# Patient Record
Sex: MALE | Race: BLACK OR AFRICAN AMERICAN | Employment: UNEMPLOYED | URBAN - NONMETROPOLITAN AREA
[De-identification: names, ages, dates, MRNs, and addresses within clinical notes are randomized per-mention and may not be internally consistent; named-entity substitution may affect disease eponyms.]

---

## 2023-04-28 ENCOUNTER — APPOINTMENT (OUTPATIENT)
Dept: CT IMAGING | Age: 28
End: 2023-04-28
Attending: EMERGENCY MEDICINE
Payer: COMMERCIAL

## 2023-04-28 ENCOUNTER — HOSPITAL ENCOUNTER (EMERGENCY)
Age: 28
Discharge: ANOTHER HOSPITAL | End: 2023-04-28
Attending: EMERGENCY MEDICINE
Payer: COMMERCIAL

## 2023-04-28 VITALS
DIASTOLIC BLOOD PRESSURE: 96 MMHG | WEIGHT: 151 LBS | HEIGHT: 68 IN | TEMPERATURE: 99 F | SYSTOLIC BLOOD PRESSURE: 133 MMHG | BODY MASS INDEX: 22.88 KG/M2 | HEART RATE: 91 BPM | RESPIRATION RATE: 16 BRPM | OXYGEN SATURATION: 96 %

## 2023-04-28 DIAGNOSIS — J39.0 PARAPHARYNGEAL ABSCESS: Primary | ICD-10-CM

## 2023-04-28 LAB
ALBUMIN SERPL-MCNC: 4.5 G/DL (ref 3.5–5)
ALBUMIN/GLOB SERPL: 1.1 (ref 1.1–2.2)
ALP SERPL-CCNC: 90 U/L (ref 45–117)
ALT SERPL-CCNC: 19 U/L (ref 12–78)
ANION GAP SERPL CALC-SCNC: 11 MMOL/L (ref 5–15)
AST SERPL W P-5'-P-CCNC: 17 U/L (ref 15–37)
BASOPHILS # BLD: 0.1 K/UL (ref 0–0.1)
BASOPHILS NFR BLD: 0 % (ref 0–1)
BILIRUB SERPL-MCNC: 1.4 MG/DL (ref 0.2–1)
BUN SERPL-MCNC: 9 MG/DL (ref 6–20)
BUN/CREAT SERPL: 8 (ref 12–20)
CA-I BLD-MCNC: 9.9 MG/DL (ref 8.5–10.1)
CHLORIDE SERPL-SCNC: 99 MMOL/L (ref 97–108)
CO2 SERPL-SCNC: 28 MMOL/L (ref 21–32)
CREAT SERPL-MCNC: 1.15 MG/DL (ref 0.7–1.3)
DEPRECATED S PYO AG THROAT QL EIA: NEGATIVE
DIFFERENTIAL METHOD BLD: ABNORMAL
EOSINOPHIL # BLD: 0 K/UL (ref 0–0.4)
EOSINOPHIL NFR BLD: 0 % (ref 0–7)
ERYTHROCYTE [DISTWIDTH] IN BLOOD BY AUTOMATED COUNT: 12.5 % (ref 11.5–14.5)
GLOBULIN SER CALC-MCNC: 4.2 G/DL (ref 2–4)
GLUCOSE SERPL-MCNC: 88 MG/DL (ref 65–100)
HCT VFR BLD AUTO: 44 % (ref 36.6–50.3)
HGB BLD-MCNC: 15.8 G/DL (ref 12.1–17)
IMM GRANULOCYTES # BLD AUTO: 0.1 K/UL (ref 0–0.04)
IMM GRANULOCYTES NFR BLD AUTO: 1 % (ref 0–0.5)
INR PPP: 1.3 (ref 0.9–1.1)
LACTATE SERPL-SCNC: 1.1 MMOL/L (ref 0.4–2)
LYMPHOCYTES # BLD: 1.7 K/UL (ref 0.8–3.5)
LYMPHOCYTES NFR BLD: 10 % (ref 12–49)
MCH RBC QN AUTO: 32.6 PG (ref 26–34)
MCHC RBC AUTO-ENTMCNC: 35.9 G/DL (ref 30–36.5)
MCV RBC AUTO: 90.7 FL (ref 80–99)
MONOCYTES # BLD: 1.6 K/UL (ref 0–1)
MONOCYTES NFR BLD: 10 % (ref 5–13)
NEUTS SEG # BLD: 13.9 K/UL (ref 1.8–8)
NEUTS SEG NFR BLD: 79 % (ref 32–75)
NRBC # BLD: 0 K/UL (ref 0–0.01)
NRBC BLD-RTO: 0 PER 100 WBC
PLATELET # BLD AUTO: 235 K/UL (ref 150–400)
PMV BLD AUTO: 9.8 FL (ref 8.9–12.9)
POTASSIUM SERPL-SCNC: 4.2 MMOL/L (ref 3.5–5.1)
PROT SERPL-MCNC: 8.7 G/DL (ref 6.4–8.2)
PROTHROMBIN TIME: 15.3 SEC (ref 11.9–14.6)
RBC # BLD AUTO: 4.85 M/UL (ref 4.1–5.7)
SODIUM SERPL-SCNC: 138 MMOL/L (ref 136–145)
WBC # BLD AUTO: 17.4 K/UL (ref 4.1–11.1)

## 2023-04-28 PROCEDURE — 74011000636 HC RX REV CODE- 636: Performed by: EMERGENCY MEDICINE

## 2023-04-28 PROCEDURE — 80053 COMPREHEN METABOLIC PANEL: CPT

## 2023-04-28 PROCEDURE — 70491 CT SOFT TISSUE NECK W/DYE: CPT

## 2023-04-28 PROCEDURE — 74011000258 HC RX REV CODE- 258: Performed by: EMERGENCY MEDICINE

## 2023-04-28 PROCEDURE — 85025 COMPLETE CBC W/AUTO DIFF WBC: CPT

## 2023-04-28 PROCEDURE — 99285 EMERGENCY DEPT VISIT HI MDM: CPT

## 2023-04-28 PROCEDURE — 83605 ASSAY OF LACTIC ACID: CPT

## 2023-04-28 PROCEDURE — 96375 TX/PRO/DX INJ NEW DRUG ADDON: CPT

## 2023-04-28 PROCEDURE — 96365 THER/PROPH/DIAG IV INF INIT: CPT

## 2023-04-28 PROCEDURE — 74011000250 HC RX REV CODE- 250: Performed by: EMERGENCY MEDICINE

## 2023-04-28 PROCEDURE — 87070 CULTURE OTHR SPECIMN AEROBIC: CPT

## 2023-04-28 PROCEDURE — 85610 PROTHROMBIN TIME: CPT

## 2023-04-28 PROCEDURE — 87040 BLOOD CULTURE FOR BACTERIA: CPT

## 2023-04-28 PROCEDURE — 87880 STREP A ASSAY W/OPTIC: CPT

## 2023-04-28 PROCEDURE — 74011250636 HC RX REV CODE- 250/636: Performed by: EMERGENCY MEDICINE

## 2023-04-28 RX ORDER — KETOROLAC TROMETHAMINE 30 MG/ML
30 INJECTION, SOLUTION INTRAMUSCULAR; INTRAVENOUS ONCE
Status: COMPLETED | OUTPATIENT
Start: 2023-04-28 | End: 2023-04-28

## 2023-04-28 RX ORDER — DIPHENHYDRAMINE HYDROCHLORIDE 50 MG/ML
50 INJECTION, SOLUTION INTRAMUSCULAR; INTRAVENOUS
Status: COMPLETED | OUTPATIENT
Start: 2023-04-28 | End: 2023-04-28

## 2023-04-28 RX ADMIN — SODIUM CHLORIDE 1000 ML: 9 INJECTION, SOLUTION INTRAVENOUS at 16:10

## 2023-04-28 RX ADMIN — IOPAMIDOL 100 ML: 755 INJECTION, SOLUTION INTRAVENOUS at 17:36

## 2023-04-28 RX ADMIN — KETOROLAC TROMETHAMINE 30 MG: 30 INJECTION, SOLUTION INTRAMUSCULAR at 16:14

## 2023-04-28 RX ADMIN — CEFTRIAXONE 2 G: 2 INJECTION, POWDER, FOR SOLUTION INTRAMUSCULAR; INTRAVENOUS at 16:13

## 2023-04-28 RX ADMIN — METHYLPREDNISOLONE SODIUM SUCCINATE 125 MG: 125 INJECTION INTRAMUSCULAR; INTRAVENOUS at 16:15

## 2023-04-28 RX ADMIN — DIPHENHYDRAMINE HYDROCHLORIDE 50 MG: 50 INJECTION, SOLUTION INTRAMUSCULAR; INTRAVENOUS at 16:09

## 2023-04-28 NOTE — PROGRESS NOTES
Discussed with ED. CT reviewed. Appears to be peritonsillar abscess. Will likely need drainage. OK to txfer to medical service at [facility] and I will consult in the AM. Please make NPO post midnight.  No need to call me with updates on transfer as I will plan on seeing him in the AM.

## 2023-04-28 NOTE — ED PROVIDER NOTES
[facility] EMERGENCY DEPT  EMERGENCY DEPARTMENT HISTORY AND PHYSICAL EXAM      Date: 4/28/2023  Patient Name: Michael Mario  MRN: [id number]  [place]: 1995  Date of evaluation: 4/28/2023  Provider: Jerome Girard MD   Note Started: 3:52 PM 4/28/23    HISTORY OF PRESENT ILLNESS     Chief Complaint   Patient presents with    Sore Throat       History Provided By: Patient    HPI: Erica Krueger Person is a 32 y.o. male     PAST MEDICAL HISTORY   Past Medical History:  History reviewed. No pertinent past medical history. Past Surgical History:  No past surgical history on file. Family History:  History reviewed. No pertinent family history. Social History: Allergies:  No Known Allergies    PCP: No primary care provider on file. Current Meds:   Previous Medications    No medications on file       PHYSICAL EXAM     ED Triage Vitals [04/28/23 1549]   ED Encounter Vitals Group      /83      Pulse (Heart Rate) (!) 104      Resp Rate 20      Temp 99 °F (37.2 °C)      Temp src       O2 Sat (%) 100 %      Weight 151 lb      Height 5' 8\"      Physical Exam  Vitals and nursing note reviewed. Constitutional:       General: He is not in acute distress. Appearance: He is not ill-appearing, toxic-appearing or diaphoretic. HENT:      Head: Normocephalic and atraumatic. Right Ear: Tympanic membrane normal.      Left Ear: Tympanic membrane normal.      Nose: Nose normal. No congestion. Mouth/Throat:      Mouth: Mucous membranes are moist.      Pharynx: Pharyngeal swelling and posterior oropharyngeal erythema present. Eyes:      Extraocular Movements: Extraocular movements intact. Conjunctiva/sclera: Conjunctivae normal.      Pupils: Pupils are equal, round, and reactive to light. Cardiovascular:      Rate and Rhythm: Normal rate and regular rhythm. Pulses: Normal pulses. Heart sounds: Normal heart sounds.    Pulmonary:      Effort: Pulmonary effort is normal.      Breath sounds: Normal breath sounds. Abdominal:      General: Bowel sounds are normal.      Palpations: Abdomen is soft. Tenderness: There is no abdominal tenderness. Musculoskeletal:         General: No tenderness, deformity or signs of injury. Normal range of motion. Cervical back: Normal range of motion and neck supple. No rigidity or tenderness. Lymphadenopathy:      Cervical: No cervical adenopathy. Skin:     General: Skin is warm and dry. Capillary Refill: Capillary refill takes less than 2 seconds. Findings: No rash. Neurological:      General: No focal deficit present. Mental Status: He is alert and oriented to person, place, and time. Cranial Nerves: No cranial nerve deficit. Sensory: No sensory deficit. Psychiatric:         Mood and Affect: Mood normal.         Behavior: Behavior normal.         SCREENINGS              LAB, EKG AND DIAGNOSTIC RESULTS   Labs:  No results found for this or any previous visit (from the past 12 hour(s)). EKG: Initial EKG interpreted by me. Not Applicable    Radiologic Studies:  Non-plain film images such as CT, Ultrasound and MRI are read by the radiologist. Plain radiographic images are visualized and preliminarily interpreted by the ED Physician with the following findings: CT shows right parapharyngeal abscess    Interpretation per the Radiologist below, if available at the time of this note:  No results found. PROCEDURES   Unless otherwise noted below, none.   Procedures      ED COURSE and DIFFERENTIAL DIAGNOSIS/MDM   CC/HPI/PE Summary, DDx: 23-year-old male presents with complaint of sore throat for 2 days patient presents today because of difficulty swallowing liquids and he has to spit when saliva accumulates in mouth    DDx pharyngeal abscess, pharyngitis        Records Reviewed (source and summary of external notes): Prior medical records and Nursing notes    Vitals:    Vitals:    04/28/23 1549   BP: 132/83   Pulse: (!) 104   Resp: 20   Temp: 99 °F (37.2 °C)   SpO2: 100%   Weight: 68.5 kg (151 lb)   Height: 5' 8\" (1.727 m)        ED COURSE  ED Course as of 04/28/23 2212 Fri Apr 28, 2023 1921 Dr. Sudha Escobar, ENT [facility] has accepted patient for transfer, patient is to be admitted to the hospitalist service waiting for transfer center called back when [facility] hospitalist has been contacted [SB]   1929 Patient concerned about going to [place] claims he does not have transportation to return home [SB]   1936 Dr Speedy Reardon hospitalist accepted patient for transfer to [facility] 5 bed [SB]   1951 Transfer center was recontacted to see if [place] [id number] which is closer to the patient's home if they have ENT [SB]   2001 [place] [address] has ENT, the hospitalist would like to review patients chart.  It will be faxed to them [SB]   2025 Pt has decided to proceed with transfer to [facility] for management of right parapharyngeal abscess [SB]   2208 Pt resting comfortably, will make pt npo after midnight [SB]      ED Course User Index  [SB] Chely Paz MD       Disposition Considerations (Tests not done, Shared Decision Making, Pt Expectation of Test or Treatment.):  Patient to be transferred     Patient was given the following medications:  Medications - No data to display    CONSULTS: (Who and What was discussed)  None     Social Determinants affecting Dx or Tx: None    Smoking Cessation: Not Applicable    CRITICAL CARE TIME   Critical Care Time:   CRITICAL CARE NOTE :    8:28 PM    Amount of Critical Care Time: __37__    IMPENDING DETERIORATION -Airway  ASSOCIATED RISK FACTORS - Hypoxia  MANAGEMENT- Bedside Assessment, Supervision of Care, and Transfer  INTERPRETATION -  CT Scan  INTERVENTIONS - Metobolic interventions  CASE REVIEW - Hospitalist/Intensivist, Medical Sub-Specialist, and Nursing  TREATMENT RESPONSE -Stable  PERFORMED BY - Self    NOTES   :  I have spent critical care time involved in lab review, consultations with specialist, family decision- making, bedside attention and documentation. This time excludes time spent in any separate billed procedures. During this entire length of time I was immediately available to the patient . FINAL IMPRESSION   No diagnosis found. DISPOSITION/PLAN       Transfer: The patient is being transferred to [facility] for parapharyngeal abscess. The results of their tests and reasons for their transfer have been discussed with the patient and/or available family. The patient/family has conveyed agreement and understanding for the need to be admitted and for their admission diagnosis. Consultation has been made with Marc Coughlin, who agrees to accept the transfer. PATIENT REFERRED TO:  Follow-up Information    None           DISCHARGE MEDICATIONS:  There are no discharge medications for this patient. DISCONTINUED MEDICATIONS:  There are no discharge medications for this patient. I am the Primary Clinician of Record: Liyah Benson MD (electronically signed)    (Please note that parts of this dictation were completed with voice recognition software. Quite often unanticipated grammatical, syntax, homophones, and other interpretive errors are inadvertently transcribed by the computer software. Please disregards these errors.  Please excuse any errors that have escaped final proofreading.)

## 2023-04-28 NOTE — ED NOTES
Pt and girlfriend made aware by MD  of CT results and the need to transfer. Currently on phone with Transfer Center- on hold at this time.  MD states pt can go to [facility]

## 2023-04-28 NOTE — ED NOTES
Transfer Center states no ENT coverage at [facility].  States she will reach out to ENT on-call at [facility] and call back

## 2023-04-28 NOTE — ED TRIAGE NOTES
Pt reports sore throat x 3 days. Pt reports painful swallowing. Pt went to Dr. Carri Colindres office today and was referred to the ER.

## 2023-04-29 ENCOUNTER — HOSPITAL ENCOUNTER (INPATIENT)
Age: 28
LOS: 1 days | Discharge: HOME | End: 2023-04-29
Attending: FAMILY MEDICINE | Admitting: FAMILY MEDICINE
Payer: COMMERCIAL

## 2023-04-29 VITALS
WEIGHT: 151 LBS | SYSTOLIC BLOOD PRESSURE: 120 MMHG | HEIGHT: 68 IN | OXYGEN SATURATION: 98 % | HEART RATE: 85 BPM | DIASTOLIC BLOOD PRESSURE: 89 MMHG | BODY MASS INDEX: 22.88 KG/M2 | RESPIRATION RATE: 18 BRPM | TEMPERATURE: 97.9 F

## 2023-04-29 PROBLEM — J39.0 PARAPHARYNGEAL ABSCESS: Status: ACTIVE | Noted: 2023-04-29

## 2023-04-29 PROBLEM — D72.829 LEUKOCYTOSIS, UNSPECIFIED TYPE: Status: ACTIVE | Noted: 2023-04-29

## 2023-04-29 PROBLEM — J39.0 PARAPHARYNGEAL ABSCESS: Status: RESOLVED | Noted: 2023-04-29 | Resolved: 2023-04-29

## 2023-04-29 PROBLEM — R13.10 ODYNOPHAGIA: Status: ACTIVE | Noted: 2023-04-29

## 2023-04-29 LAB
ANION GAP SERPL CALC-SCNC: 5 MMOL/L (ref 5–15)
APTT PPP: 29.5 SEC (ref 22.1–31)
BACTERIA SPEC CULT: NORMAL
BASOPHILS # BLD: 0 K/UL (ref 0–0.1)
BASOPHILS NFR BLD: 0 % (ref 0–1)
BUN SERPL-MCNC: 17 MG/DL (ref 6–20)
BUN/CREAT SERPL: 14 (ref 12–20)
CALCIUM SERPL-MCNC: 9.4 MG/DL (ref 8.5–10.1)
CHLORIDE SERPL-SCNC: 103 MMOL/L (ref 97–108)
CO2 SERPL-SCNC: 25 MMOL/L (ref 21–32)
CREAT SERPL-MCNC: 1.21 MG/DL (ref 0.7–1.3)
DIFFERENTIAL METHOD BLD: ABNORMAL
EOSINOPHIL # BLD: 0 K/UL (ref 0–0.4)
EOSINOPHIL NFR BLD: 0 % (ref 0–7)
ERYTHROCYTE [DISTWIDTH] IN BLOOD BY AUTOMATED COUNT: 12.4 % (ref 11.5–14.5)
GLUCOSE SERPL-MCNC: 246 MG/DL (ref 65–100)
HCT VFR BLD AUTO: 40.5 % (ref 36.6–50.3)
HGB BLD-MCNC: 14.1 G/DL (ref 12.1–17)
IMM GRANULOCYTES # BLD AUTO: 0 K/UL (ref 0–0.04)
IMM GRANULOCYTES NFR BLD AUTO: 0 % (ref 0–0.5)
INR PPP: 1.1 (ref 0.9–1.1)
LYMPHOCYTES # BLD: 0.9 K/UL (ref 0.8–3.5)
LYMPHOCYTES NFR BLD: 7 % (ref 12–49)
MAGNESIUM SERPL-MCNC: 2.3 MG/DL (ref 1.6–2.4)
MCH RBC QN AUTO: 31.8 PG (ref 26–34)
MCHC RBC AUTO-ENTMCNC: 34.8 G/DL (ref 30–36.5)
MCV RBC AUTO: 91.2 FL (ref 80–99)
MONOCYTES # BLD: 0.4 K/UL (ref 0–1)
MONOCYTES NFR BLD: 3 % (ref 5–13)
NEUTS SEG # BLD: 11.4 K/UL (ref 1.8–8)
NEUTS SEG NFR BLD: 90 % (ref 32–75)
NRBC # BLD: 0 K/UL (ref 0–0.01)
NRBC BLD-RTO: 0 PER 100 WBC
PLATELET # BLD AUTO: 229 K/UL (ref 150–400)
PMV BLD AUTO: 10.2 FL (ref 8.9–12.9)
POTASSIUM SERPL-SCNC: 4.6 MMOL/L (ref 3.5–5.1)
PROTHROMBIN TIME: 11.7 SEC (ref 9–11.1)
RBC # BLD AUTO: 4.44 M/UL (ref 4.1–5.7)
SODIUM SERPL-SCNC: 133 MMOL/L (ref 136–145)
SPECIAL REQUESTS,SREQ: NORMAL
THERAPEUTIC RANGE,PTTT: NORMAL SECS (ref 58–77)
WBC # BLD AUTO: 12.7 K/UL (ref 4.1–11.1)

## 2023-04-29 PROCEDURE — 85730 THROMBOPLASTIN TIME PARTIAL: CPT

## 2023-04-29 PROCEDURE — 74011250636 HC RX REV CODE- 250/636: Performed by: FAMILY MEDICINE

## 2023-04-29 PROCEDURE — 85610 PROTHROMBIN TIME: CPT

## 2023-04-29 PROCEDURE — 36415 COLL VENOUS BLD VENIPUNCTURE: CPT

## 2023-04-29 PROCEDURE — 85025 COMPLETE CBC W/AUTO DIFF WBC: CPT

## 2023-04-29 PROCEDURE — 74011250637 HC RX REV CODE- 250/637: Performed by: FAMILY MEDICINE

## 2023-04-29 PROCEDURE — 96374 THER/PROPH/DIAG INJ IV PUSH: CPT

## 2023-04-29 PROCEDURE — 83735 ASSAY OF MAGNESIUM: CPT

## 2023-04-29 PROCEDURE — 74011250637 HC RX REV CODE- 250/637: Performed by: NURSE PRACTITIONER

## 2023-04-29 PROCEDURE — 65270000029 HC RM PRIVATE

## 2023-04-29 PROCEDURE — 80048 BASIC METABOLIC PNL TOTAL CA: CPT

## 2023-04-29 PROCEDURE — G0378 HOSPITAL OBSERVATION PER HR: HCPCS

## 2023-04-29 PROCEDURE — 74011000250 HC RX REV CODE- 250: Performed by: OTOLARYNGOLOGY

## 2023-04-29 RX ORDER — POLYETHYLENE GLYCOL 3350 17 G/17G
17 POWDER, FOR SOLUTION ORAL DAILY PRN
Status: DISCONTINUED | OUTPATIENT
Start: 2023-04-29 | End: 2023-04-29 | Stop reason: HOSPADM

## 2023-04-29 RX ORDER — SODIUM CHLORIDE 0.9 % (FLUSH) 0.9 %
5-40 SYRINGE (ML) INJECTION EVERY 8 HOURS
Status: DISCONTINUED | OUTPATIENT
Start: 2023-04-29 | End: 2023-04-29 | Stop reason: HOSPADM

## 2023-04-29 RX ORDER — KETOROLAC TROMETHAMINE 30 MG/ML
30 INJECTION, SOLUTION INTRAMUSCULAR; INTRAVENOUS
Status: DISCONTINUED | OUTPATIENT
Start: 2023-04-29 | End: 2023-04-29 | Stop reason: HOSPADM

## 2023-04-29 RX ORDER — AMOXICILLIN AND CLAVULANATE POTASSIUM 875; 125 MG/1; MG/1
1 TABLET, FILM COATED ORAL EVERY 12 HOURS
Qty: 19 TABLET | Refills: 0 | Status: SHIPPED | OUTPATIENT
Start: 2023-04-29 | End: 2023-05-09

## 2023-04-29 RX ORDER — IBUPROFEN 200 MG
1 TABLET ORAL DAILY
Status: DISCONTINUED | OUTPATIENT
Start: 2023-04-29 | End: 2023-04-29 | Stop reason: HOSPADM

## 2023-04-29 RX ORDER — ONDANSETRON 4 MG/1
4 TABLET, ORALLY DISINTEGRATING ORAL
Status: DISCONTINUED | OUTPATIENT
Start: 2023-04-29 | End: 2023-04-29 | Stop reason: HOSPADM

## 2023-04-29 RX ORDER — SODIUM CHLORIDE 9 MG/ML
75 INJECTION, SOLUTION INTRAVENOUS CONTINUOUS
Status: DISCONTINUED | OUTPATIENT
Start: 2023-04-29 | End: 2023-04-29 | Stop reason: HOSPADM

## 2023-04-29 RX ORDER — AMOXICILLIN AND CLAVULANATE POTASSIUM 875; 125 MG/1; MG/1
1 TABLET, FILM COATED ORAL EVERY 12 HOURS
Status: DISCONTINUED | OUTPATIENT
Start: 2023-04-29 | End: 2023-04-29 | Stop reason: HOSPADM

## 2023-04-29 RX ORDER — ACETAMINOPHEN 325 MG/1
650 TABLET ORAL
Status: DISCONTINUED | OUTPATIENT
Start: 2023-04-29 | End: 2023-04-29 | Stop reason: HOSPADM

## 2023-04-29 RX ORDER — ONDANSETRON 2 MG/ML
4 INJECTION INTRAMUSCULAR; INTRAVENOUS
Status: DISCONTINUED | OUTPATIENT
Start: 2023-04-29 | End: 2023-04-29 | Stop reason: HOSPADM

## 2023-04-29 RX ORDER — SODIUM CHLORIDE 0.9 % (FLUSH) 0.9 %
5-40 SYRINGE (ML) INJECTION AS NEEDED
Status: DISCONTINUED | OUTPATIENT
Start: 2023-04-29 | End: 2023-04-29 | Stop reason: HOSPADM

## 2023-04-29 RX ORDER — ACETAMINOPHEN 650 MG/1
650 SUPPOSITORY RECTAL
Status: DISCONTINUED | OUTPATIENT
Start: 2023-04-29 | End: 2023-04-29 | Stop reason: HOSPADM

## 2023-04-29 RX ADMIN — SODIUM CHLORIDE 75 ML/HR: 900 INJECTION, SOLUTION INTRAVENOUS at 01:33

## 2023-04-29 RX ADMIN — METHYLPREDNISOLONE SODIUM SUCCINATE 40 MG: 40 INJECTION, POWDER, FOR SOLUTION INTRAMUSCULAR; INTRAVENOUS at 10:27

## 2023-04-29 RX ADMIN — BENZOCAINE, BUTAMBEN, AND TETRACAINE HYDROCHLORIDE 1 SPRAY: .028; .004; .004 AEROSOL, SPRAY TOPICAL at 08:22

## 2023-04-29 RX ADMIN — AMOXICILLIN AND CLAVULANATE POTASSIUM 1 TABLET: 875; 125 TABLET, FILM COATED ORAL at 11:40

## 2023-04-29 NOTE — DISCHARGE SUMMARY
Discharge Summary     PATIENT ID: Dodie Calles  MRN: [id number]   YOB: 1995    DATE OF ADMISSION: 4/29/2023 12:48 AM    DATE OF DISCHARGE: 4/29/2023   PRIMARY CARE PROVIDER: None - Unknown name  ATTENDING PHYSICIAN: Jonah Jordan MD  DISCHARGING PROVIDER: Evelene Koyanagi, NP      CONSULTATIONS: IP CONSULT TO OTOLARYNGOLOGY    PROCEDURES/SURGERIES: * No surgery found *    [address] [facility],[address] COURSE:     Mr. Leon Pond is a 32 y.o. male with no significant past medical history presented as a direct admission/transfer from [facility] ED with chief complaint of sore throat and difficulty swallowing. Symptoms onset reported began ~ 3 days PTA, has been constant/severe, mostly right-sided without specific alleviating factors. No reports of fever, chills, recent upper respiratory infections noted. ED consulted ENT on call covering for [facility], recommendations were for transfer to [facility]. On arrival, patient notes some improvement in his pain symptoms and was able to minimally swallow. DISCHARGE DIAGNOSES / PLAN:      Parapharyngeal abscess  - CT neck soft tissues with IV contrast showed 3.2 cm x 2.5 cm x 2.5 cm fluid collection in the right parapharyngeal area causing mass effect on the adjacent airway with several enlarged lymph nodes in the right neck thought likely reactive.     - ENT for drainage of abscess, done this am. Only follow up as needed if symptoms recur - Dr. Macario Ortiz information provided on d/c information  - start on augmentin, d/c on 10 days to complete course     Odynophagia  - due to parapharyngeal abscess  - tylenol for pain as needed  - soft foods     Leukocytosis unspecified  - likely due to abscess  - WBC 17.4 ->12.7  - outpatient follow up with PCP     Tobacco abuse  - Encourage smoking cessation  - may consider OTC patches on discharge     Hyperbilirubinemia  - T. bili 1.4  - follow up outpatient     Alcohol abuse  - Encourage alcohol reduction      Tachycardia  - Currently resolved, HR 86 on exam today    BMI: Body mass index is 22.96 kg/m². . This patient: Has a BMI within normal limits. PENDING TEST RESULTS:   At the time of discharge the following test results are still pending: none     ADDITIONAL CARE RECOMMENDATIONS:   Take full course of antibiotics  Tylenol for pain management     DIET: Regular Diet, as tolerated  Encouraged soft foods for now    ACTIVITY: Activity as tolerated    EQUIPMENT needed: none    NOTIFY YOUR PHYSICIAN FOR ANY OF THE FOLLOWING:   Fever over 101 degrees for 24 hours. Chest pain, shortness of breath, fever, chills, nausea, vomiting, diarrhea, change in mentation, falling, weakness, bleeding. Severe pain or pain not relieved by medications, as well as any other signs or symptoms that you may have questions about. FOLLOW UP APPOINTMENTS:    Follow-up Information       Follow up With Specialties Details Why Bharati Keller MD Otolaryngology Follow up As needed [phone]            Please follow up with assigned PCP in ~ 1 week, have CBC and CMP drawn  WBC in hospital 17.4 ->12.7  T bili 1.4    DISCHARGE MEDICATIONS:  Current Discharge Medication List        START taking these medications    Details   amoxicillin-clavulanate (AUGMENTIN) 875-125 mg per tablet Take 1 Tablet by mouth every twelve (12) hours for 19 doses.  Indications: an infection of the skin and the tissue below the skin  Qty: 19 Tablet, Refills: 0  Start date: 4/29/2023, End date: 5/9/2023           DISPOSITION:    Home With:   OT  PT  HH  RN       Long term SNF/Inpatient Rehab    Independent/assisted living    Hospice   xx Other: Home     PATIENT CONDITION AT DISCHARGE:   Functional status    Poor     Deconditioned    xx Independent      Cognition   xx  Lucid     Forgetful     Dementia      Catheters/lines (plus indication)    G[place]ales     PICC     PEG    xx None      Code status   xx  Full code     DNR      PHYSICAL EXAMINATION AT DISCHARGE:    Patient was seen and examined this morning, he was walking around the nursing unit in no acute distress. ENT has been in this morning and drained the patient's abscess and recommended discharge. Patient will be discharged with 10 days of Augmentin, first dose to be given but prior to his discharge home. Prescriptions sent to his pharmacy of choice, encouraged him to eat softer foods for now until his throat soreness and pain diminish. Patient otherwise has no complaints of dizziness, headaches, chest pain, chest pressure, shortness of breath. Denies any GI complaints such as nausea, vomiting, diarrhea or constipation and has no dysuria. He does still endorse some throat soreness but was able to take an ice cream today without difficulty. He indicates that his throat is feeling much better since the abscess was drained. It was recommended that patient follow-up with his PCP, he reports that he did sign up for healthcare at an office [place] where he resides. He is unsure of the name of the physician, but will call to make an appointment for follow-up. He is aware that he does not need to follow-up with ENT unless his symptoms return. General:          Young -American male sitting up on side of bed in no acute distress. Cooperative and interactive with assessment     HEENT:           Pharyngeal area is swollen and red, swallowing has subjectively improved. Mucous membranes moist.  Neck:               Trachea is midline  Lungs:             Clear to auscultation bilaterally. Sats are 98% on room air. Heart:              Regular  rhythm, heart rate 86. Abdomen:        Soft, non-tender. Not distended. Bowel sounds normal  Extremities:     Moves all extremities. No edema. Skin:                Not pale. No rashes. Has multiple tattoos. Psych:             Not anxious or agitated.   Neurologic:      Alert, moves all extremities, answers questions appropriately and responds to commands     CHRONIC MEDICAL DIAGNOSES:  Problem List as of 4/29/2023 Date Reviewed: 4/29/2023            Codes Class Noted - Resolved    Leukocytosis, unspecified type ICD-10-CM: D72.829  ICD-9-CM: 288.60  4/29/2023 - Present        Odynophagia ICD-10-CM: R13.10  ICD-9-CM: 787.20  4/29/2023 - Present        RESOLVED: Parapharyngeal abscess ICD-10-CM: J39.0  ICD-9-CM: 478.22  4/29/2023 - 4/29/2023         Greater than 30 minutes were spent with the patient on counseling and coordination of care    Signed:   Bri Ngo NP  4/29/2023  10:21 AM

## 2023-04-29 NOTE — CONSULTS
Ears/Nose/Throat Consult    Subjective:     Date of Consultation:  April 29, 2023    Referring Physician:     History of Present Illness:   Patient is a 32 y.o.  male who is being seen for PTA R side. he  was admitted to the hospital for Parapharyngeal abscess [J39.0]  Odynophagia [R13.10]  Leukocytosis, unspecified type [D72.829]. He is feeling much better after iv steroid/abx. No SOB. No past medical history on file. No family history on file. Social History     Tobacco Use    Smoking status: Not on file    Smokeless tobacco: Not on file   Substance Use Topics    Alcohol use: Not on file     No past surgical history on file. Current Facility-Administered Medications   Medication Dose Route Frequency    cefTRIAXone (ROCEPHIN) 1 g in 0.9% sodium chloride 10 mL IV syringe  1 g IntraVENous Q24H    0.9% sodium chloride infusion  75 mL/hr IntraVENous CONTINUOUS    sodium chloride (NS) flush 5-40 mL  5-40 mL IntraVENous Q8H    sodium chloride (NS) flush 5-40 mL  5-40 mL IntraVENous PRN    acetaminophen (TYLENOL) tablet 650 mg  650 mg Oral Q6H PRN    Or    acetaminophen (TYLENOL) suppository 650 mg  650 mg Rectal Q6H PRN    polyethylene glycol (MIRALAX) packet 17 g  17 g Oral DAILY PRN    ondansetron (ZOFRAN ODT) tablet 4 mg  4 mg Oral Q8H PRN    Or    ondansetron (ZOFRAN) injection 4 mg  4 mg IntraVENous Q6H PRN    ketorolac (TORADOL) injection 30 mg  30 mg IntraVENous Q6H PRN    methylPREDNISolone (PF) (SOLU-MEDROL) injection 40 mg  40 mg IntraVENous Q6H    nicotine (NICODERM CQ) 14 mg/24 hr patch 1 Patch  1 Patch TransDERmal DAILY    lidocaine-EPINEPHrine (PF) (XYLOCAINE) 1 %-1:200,000 injection 15 mg  1.5 mL SubCUTAneous ONCE      No Known Allergies     Review of Systems:  Pertinent items are noted in the History of Present Illness.      Objective:     Patient Vitals for the past 8 hrs:   BP Temp Pulse Resp SpO2 Height Weight   04/29/23 0144 -- -- -- -- -- 5' 8\" (1.727 m) 68.5 kg (151 lb)   04/29/23 0055 136/89 97.5 °F (36.4 °C) 64 18 99 % -- --     Temp (24hrs), Av.5 °F (36.4 °C), Min:97.5 °F (36.4 °C), Max:97.5 °F (36.4 °C)    No intake/output data recorded. Physical Exam:   General  General Appearance- Well nourished adult in no apparent distress who is alert and cooperative. Gait- Normal. Voice- Normal voice and communication. HEENT  Head: Normally developed without evidence of trauma or lesions. Face:  Skin:  Normal color, texture, and turgor. There are no lesions of concern nor swelling or induration. Lips- normal.  Facial Nerve- Bilateral- function is equal and symmetrical with no deficit. Ear: Auricle- Left- Normal development without sinus pit, cyst or any other lesion. Right- Normal development without sinus pit, cyst or any other lesion  Auditory Canal (otoscopic examination) - Left- clean, without edema, discharge, or lesions. Right - clean, without edema, discharge, or lesions. Mastoid- Left- No erythema, edema, tenderness, or protrusion of the auricle. Right- No erythema, edema, tenderness, or protrusion of the auricle. Nose: External Nose- Normally developed without lesion. Nasal Mucosa- moist and pink without erythema, edema, or lesions. Nasal septum- Caudally the septum is relatively straight without lesion. Turbinates- Bilateral- The turbinates are without hypertrophy, edema, or lesion. Sinuses-  All sinuses are nontender to percussion. Oral Cavity/Oropharynx--Dentition- Normal dentition for age witho no evidence of discoloration, inflammation, or infection. Oral Mucosa: moist without erythema or lesions. Tongue- no lesions or palpable masses. Floor of mouth- soft without palpable masses or mucosal lesion. Palate and uvula- Palate is without cleft and the uvula is not bifid. Soft palate elevates symmetrically. Tonsils- L normal. R with edema/erythema. Procedure:  Cetacaine sprayed and 18 ga needle used to aspirate 9cc pus. No significant bleeding.      Salivary Ducts- Ducts appear to be normal.  Throat: Pharynx- Normal mucosal lining without erythema or mass. Larynx: difficult to examine directly. Neck:-- Trachea- midline with normal laryngeal framework with no crepitus. Salivary Glands- normal to palpation without nodules or tenderness. Thyroid- normal to palpation without mass or irregularity. Lymph Nodes- No palpable adenopathy or masses. Range of Motion- good in all directions, with good anterior-posterior and lateral flexion and rotation. Assessment:     PTA R side. Hospital Problems  Never Reviewed            Codes Class Noted POA    Parapharyngeal abscess ICD-10-CM: J39.0  ICD-9-CM: 478.22  4/29/2023 Unknown        Leukocytosis, unspecified type ICD-10-CM: D72.829  ICD-9-CM: 288.60  4/29/2023 Unknown        Odynophagia ICD-10-CM: R13.10  ICD-9-CM: 787.20  4/29/2023 Unknown             Plan:     R PTA drained without incident. Pt tolerated very well. No airway distress. Fine to d/c home on oral abx. Augmentin or Clindamycin recommended. Does not need follow up unless sx recur.       Signed By: Doc Mcwilliams MD     April 29, 2023

## 2023-04-29 NOTE — PROGRESS NOTES
Transition of Care Plan  RUR- Low 6%   DISPOSITION: The disposition plan is home with family assistance  No Cm Needs at this time   F/U with PCP/Specialist    Transport: Family       CM: [address] MSW,   [phone]

## 2023-04-29 NOTE — PROGRESS NOTES
Patient arrived via [facility] [address] ambulance. Patient up ad josefina, ambulated to bathroom. Patient denies any pain at this time, just states that his throat is sore. Resting comfortably in bed. VSS. Will notify Dr. Tiffanie De La Cruz that patient has arrived.

## 2023-04-29 NOTE — H&P
History and Physical        Date of Service:  4/29/2023  Primary Care Provider: None  Source of information: The patient and Chart review    LAST ENTRY NOTE/ DOCUMENTATION:   Patient was seen earlier this morning. H&P however is dictated at this time. Chief Complaint: sore throat and difficulty swallowing      History of Presenting Illness: Jordin Calles is a 32 y.o. male with no significant past medical history presented as a direct admission/transfer from [facility] ED with chief complaint of sore throat and difficulty swallowing. Symptoms onset reported began approximate 3 days ago, remains constant, severe, mostly right-sided without specific alleviating factors. There is no reports of fever, chills, recent upper respiratory infections noted. On arrival at the ED, initial recorded vital signs temperature 99.0 °F, /83, heart rate 104, respiratory rate 20, O2 saturations 100% on room air. Abnormal labs included WBC 17.4, neutrophils 79%, total bilirubin 1.4. CT neck soft tissues with IV contrast showed 3.2 cm x 2.5 cm x 2.5 cm fluid collection in the right parapharyngeal area causing mass effect on the adjacent airway with several enlarged lymph nodes in the right neck thought likely reactive. ED consulted ENT on call covering for [facility].  Recommendations were for transfer here. Patient was started on ceftriaxone, same Medrol and given Benadryl. He was also given dose of Toradol 30 mg IV x1 dose. Patient was in excepted and transferred to this facility. On arrival, patient notes some improvement in his pain symptoms. He notes he is able to minimally swallow. Subsequently patient has been kept n.p.o. pending ENT evaluation this morning. REVIEW OF SYSTEMS:  A comprehensive review of systems was negative except for that written in the History of Present Illness.      PAST MEDICAL HISTORY:  NONE    PAST SURGICAL HISTORY:  NONE    MEDICATIONS:  NONE    ALLERGIES:  NO KNOWN DRUG ALLERGIES    FAMILY HISTORY:  No chronic conditions noted    SOCIAL HISTORY:  Smoking:  smokes ~ 1 1/2 ppd cigarettes  Alcohol:  drinks ~ 3-4 beers per day  Illicit drugs:  denies      Social Determinants of Health     Tobacco Use: Not on file   Alcohol Use: Not on file   Financial Resource Strain: Not on file   Food Insecurity: Not on file   Transportation Needs: Not on file   Physical Activity: Not on file   Stress: Not on file   Social Connections: Not on file   Intimate Partner Violence: Not on file   Depression: Not on file   Housing Stability: Not on file        Medications were reconciled to the best of my ability given all available resources at the time of admission. Route is PO if not otherwise noted. Family and social history were personally reviewed, all pertinent and relevant details are outlined as above. Objective:   Visit Vitals  /89 (BP 1 Location: Right arm, BP Patient Position: At rest;Sitting)   Pulse 64   Temp 97.5 °F (36.4 °C)   Resp 18   Ht 5' 8\" (1.727 m)   Wt 68.5 kg (151 lb)   SpO2 99%   BMI 22.96 kg/m²      O2 Device: None (Room air)    PHYSICAL EXAM:   General:  Patient is in no acute respiratory distress. Head:  Normocephalic, without obvious abnormality, atraumatic   Eyes:  Conjunctivae/corneas clear. Pupils 2 mm reactive bilateral.   E/N/M/T: Nares normal. Septum midline.  No nasal drainage or sinus tenderness  Tongue midline/ non-edematous  Marked right sided peritonsillar posterior pharyngeal edema, erythema, with some exudate    Neck: Normal appearance and movements, symmetrical, trachea midline  No palpable adenopathy  No thyroid enlargement, tenderness or nodules  No carotid bruit   No JVD  Trachea midline   Lungs:   Symmetrical chest expansion and respiratory effort  Clear to auscultation bilaterally   Chest wall:  No tenderness or deformity   Heart:  Regular rate and rhythm   Normal S1 and S2; no murmur, click, rub or gallop   Abdomen:   Soft, no tenderness  No rebound, guarding, or rigidity  Non-distended   Bowel sounds normal  No masses or hepatosplenomegaly  No hernias present   Back: No costovertebral angle tenderness  No step-off deformity   Extremities: Extremities normal, atraumatic  No cyanosis or edema     Vascular/  Pulses: 2+ radial/ DP bilateral pulses   Integument/  Skin: No rashes or ulcers  Warm and dry   Musculo-      skeletal: Gait not tested  Normal symmetry, ROM, strength and tone  No calf tenderness   Neuro: GCS 15. Moves all extremities x 4. No slurred speech. No facial droop. Sensation grossly intact. Psych: Alert, oriented x 3              Data Review:   I have independently reviewed and interpreted patient's lab and all other diagnostic data    Abnormal Labs Reviewed   METABOLIC PANEL, BASIC - Abnormal; Notable for the following components:       Result Value    Sodium 133 (*)     Glucose 246 (*)     All other components within normal limits   CBC WITH AUTOMATED DIFF - Abnormal; Notable for the following components:    WBC 12.7 (*)     NEUTROPHILS 90 (*)     LYMPHOCYTES 7 (*)     MONOCYTES 3 (*)     ABS. NEUTROPHILS 11.4 (*)     All other components within normal limits   PROTHROMBIN TIME + INR - Abnormal; Notable for the following components:    Prothrombin time 11.7 (*)     All other components within normal limits       All Micro Results       None            IMAGING:   No orders to display        ECG/ECHO:  No results found for this or any previous visit. Notes reviewed from all clinical/nonclinical/nursing services involved in patient's clinical care. Care coordination discussions were held with appropriate clinical/nonclinical/ nursing providers based on care coordination needs. Assessment:   Given the patient's current clinical presentation, there is a high level of concern for decompensation if discharged from the emergency department.  Complex decision making was performed, which includes reviewing the patient's available past medical records, laboratory results, and imaging studies. Active Problems:    Parapharyngeal abscess (4/29/2023)      Leukocytosis, unspecified type (4/29/2023)      Odynophagia (4/29/2023)        Plan:     Parapharyngeal abscess  -CT neck soft tissues with IV contrast showed 3.2 cm x 2.5 cm x 2.5 cm fluid collection in the right parapharyngeal area causing mass effect on the adjacent airway with several enlarged lymph nodes in the right neck thought likely reactive.    -keep NPO  -continue IV fluids  -continue Solumedrol 40 mg IV q 6 hours  -consult ENT for drainage of abscess  -continue IV antibiotics:  Ceftriaxone and Vancomycin    2. Odynophagia  -due to parapharyngeal abscess  -order Toradol 30 mg IV every 6 hours as needed for pain    3. Leukocytosis unspecified  -WBC 17.4, neutrophils 79%  -Repeat WBC    4. Tobacco abuse  -Encourage smoking cessation  -Order nicotine patch    5. Hyperbilirubinemia  -T. bili 1.4  -Repeat CMP    6. Alcohol abuse  -Encourage alcohol reduction and compliance with CDC guidelines    7. Tachycardia  -Currently resolved      DIET: DIET NPO   ISOLATION PRECAUTIONS: There are currently no Active Isolations  CODE STATUS: Full Code   DVT PROPHYLAXIS: SCDs  FUNCTIONAL STATUS PRIOR TO HOSPITALIZATION: Fully active and ambulatory; able to carry on all self-care without restriction. Ambulatory status/function: By self   EARLY MOBILITY ASSESSMENT: Recommend routine ambulation while hospitalized with the assistance of nursing staff  ANTICIPATED DISCHARGE: 24-48 hours. ANTICIPATED DISPOSITION: Home      CRITICAL CARE WAS PERFORMED FOR THIS ENCOUNTER: NO.      Signed By: Shayy Sim MD     April 29, 2023         Please note that this dictation may have been completed with Dragon, the [employer] voice recognition software.   Quite often unanticipated grammatical, syntax, homophones, and other interpretive errors are inadvertently transcribed by the computer software. Please disregard these errors. Please excuse any errors that have escaped final proofreading.

## 2023-04-29 NOTE — DISCHARGE INSTRUCTIONS
Discharge Instructions     PATIENT ID: Leah Calles  MRN: [id number]   YOB: 1995    DATE OF ADMISSION: 4/29/2023   DATE OF DISCHARGE: 4/29/2023  PRIMARY CARE PROVIDER: None   ATTENDING PHYSICIAN: Tung John MD  DISCHARGING PROVIDER: Brooke Meredith NP      DISCHARGE DIAGNOSES   Parapharyngeal Abscess    CONSULTATIONS:   ENT    PROCEDURES/SURGERIES: * No surgery found *    PENDING TEST RESULTS:   At the time of discharge the following test results are still pending: final blood cultures    FOLLOW UP APPOINTMENTS:   Follow-up Information       Follow up With Specialties Details Why Contact Info    Jeff Cameron MD Otolaryngology Follow up As needed [phone]                    Please follow up with assigned PCP in ~ 1 week, have CBC and CMP drawn  WBC in hospital 17.4 ->12.7  T bili 1.4    ADDITIONAL CARE RECOMMENDATIONS:   Take full course of antibiotics  Tylenol for pain management     DIET: Regular Diet, as tolerated  Encouraged soft foods for now    ACTIVITY: Activity as tolerated    EQUIPMENT needed: none    DISCHARGE MEDICATIONS:   See Medication Reconciliation Form    It is important that you take the medication exactly as they are prescribed. Keep your medication in the bottles provided by the pharmacist and keep a list of the medication names, dosages, and times to be taken in your wallet. Do not take other medications without consulting your doctor. NOTIFY YOUR PHYSICIAN FOR ANY OF THE FOLLOWING:   Fever over 101 degrees for 24 hours. Chest pain, shortness of breath, fever, chills, nausea, vomiting, diarrhea, change in mentation, falling, weakness, bleeding. Severe pain or pain not relieved by medications. Or, any other signs or symptoms that you may have questions about.     DISPOSITION:    Home With:   OT  PT  HH  RN       SNF/Inpatient Rehab/LTAC    Independent/assisted living    Hospice   xx Other: Home     CDMP Checked: Yes xx     PROBLEM LIST Updated:  Yes xx     Signed:   Ramón Zaldivar NP  4/29/2023  11:18 AM

## 2023-04-30 LAB
BACTERIA SPEC CULT: NORMAL
SPECIAL REQUESTS,SREQ: NORMAL

## 2023-05-04 LAB
BACTERIA SPEC CULT: NORMAL
SPECIAL REQUESTS,SREQ: NORMAL